# Patient Record
(demographics unavailable — no encounter records)

---

## 2024-10-11 NOTE — ASSESSMENT
[FreeTextEntry1] : ATTENDING ATTESTATIONending  64 year old female here for NPA. Pmhx of GERD, Insomnia, MV/TV replacement/repeair?, tachycardia, lower back pain, HLD, anxiety. Here today with daughter Shanel.  ID [id number]  Here today for persistent SOB/cough - Cont fluticasone nasal spray  - START Symbicort rinse mouth out after - Cont albuterol hfa/nebs Q4-6H PRN  - Avoid PO steroids possible ULCER - CPFT was wnl 9/30/24 Normal spirometry.  No significant bronchodilator response.  Normal TLC, elevated ERV, reduced RV.  Normal DLCO. - Reinforced importance of f/u with GI for GERD likely contributing to symptoms - Chest CT 8/18/24 - lungs clear (CXR and CTPA 8/22 were wnl) CXR 10/4/24 reportedly normal - Walked in office mirela 91% quickly went up to 96% on room air with HR 85 - Recommended pt f/u with cardiology for ECHO  - unclear if needs Cardiac clearance for EGD sent message to GI  - Discussed importance of cancer screenings and to f/u with PCP provided list   Preprocedure Respiratory Clearance - patient is at Mild risk for pulmonary complications from ENDOSCOPY PROCEDURE with conscious sedation. Pt is optimized from a respiratory standpoint. I recommended patient to continue ACT and maintenance medications after procedure.  f/u [facility]

## 2024-10-11 NOTE — END OF VISIT
[FreeTextEntry3] : I, Dr. Winter Cesar personally performed the evaluation and management (E/M) services for this new patient.  That E/M includes conducting the initial examination, assessing all conditions, and establishing the plan of care.  Today, Ana Luisa Yanez ACP was here to observe my evaluation and management services for this patient to be followed going forward.  Patient here for follow up of shortness of breath and suspect it is mainly attributed to symptoms of severe GERD. She had another recent ER visit at outside hospital for dyspnea and was managed as severe GERD and discharged from ED. She has unintentional weight loss and is on protonix, famotidine, sucralfate.  She has clear lungs on exam, on CT chest, O2 sat is fine 99%, normal spirometry. In supine position, she is not short of breath and only endorses epigastric discomfort.   Re-educated her on Symbicort inhaler use (she did not fill it from last OV) but EGD appears to be priority. She does not have any pulmonary contraindications to EGD currently and is optimized from a respiratory standpoint. I reached out to GI to contact pt to clarify that she fulfilled her cardiac clearance and procedure remains scheduled for next week.  Follow-up in office in 3 months or sooner if needed.  45 minutes time spent for patient education related to comorbidities and medications, medical records/labs/radiology reviews, preventative care, documentation, coordination of care.

## 2024-10-11 NOTE — PHYSICAL EXAM
[No Acute Distress] : no acute distress [Normal Oropharynx] : normal oropharynx [Normal Appearance] : normal appearance [Supple] : supple [Normal Rate/Rhythm] : normal rate/rhythm [Normal S1, S2] : normal s1, s2 [No Resp Distress] : no resp distress [No Acc Muscle Use] : no acc muscle use [Clear to Auscultation Bilaterally] : clear to auscultation bilaterally [Benign] : benign [Normal Bowel Sounds] : normal bowel sounds [No Clubbing] : no clubbing [No Cyanosis] : no cyanosis [No Edema] : no edema [Normal Color/ Pigmentation] : normal color/ pigmentation [No Focal Deficits] : no focal deficits [Oriented x3] : oriented x3 [Normal Affect] : normal affect [Soft] : soft [TextBox_2] : thin female [TextBox_11] : mild PND [TextBox_54] : murmur appreciated [TextBox_68] : no wheezes or crackles. [TextBox_89] : hypogastric tenderness to palpation [TextBox_99] : Nontender Back palpated

## 2024-10-11 NOTE — HISTORY OF PRESENT ILLNESS
[Never] : never [TextBox_4] : 64 year old female here for NPA. Pmhx of GERD, Insomnia, MV/TV replacement/repair?, tachycardia, lower back pain, HLD, anxiety. Here today with spouse.  ID [id number]  Patient is here today reporting persistent SOB and intermittent cough with some clear sputum since 8/12 when URI symptoms began. Pt has been seen by several ERs and UCs in the last 2 months. At her last visit she was prescribed Prednisone, Zpack, Symbciort and Albuterol. She did not  the Symbicort. She felt no improvement on any of these medications. She is still taking albuterol nebs 4 times a day with no reported benefit. Was also previously prescribed azithromycin, tessalon, prednisone, keflex, fluticasone inhaler, albuterol hfa, IM dose of solumedrol at a . She went to [facility] on 10/4 again for SOB and was prescribed Famotidine and Tylenol, CXR and Back Xray per patient were normal. She has had CXRs, Chest CT and a CTPA all wnl since August. Weight loss 10 lbs in the 2 months (also has severe GERD pending EGD on PPI, Sucralfate, H2), chills, fatigue. Reports normal Bms. No fevers, bodyaches, vomiting, swelling, cp, pnd, hx of DM/HF/Glaucoma, night sweats, wheeze. Using Ensure and recently prescribed appetite stimulant. Over due for Mammo, PAP and COLO. Never smoker, no drugs/etoh/vaping. Doesn't work. Born in [place]. Unclear if she still needs cardiac clearance and has not had ECHO recently.

## 2024-10-17 NOTE — PLAN
Received request via: Pharmacy    Was the patient seen in the last year in this department? Yes    Does the patient have an active prescription (recently filled or refills available) for medication(s) requested? No    Does the patient have [facility] Plus and need 100 day supply (blood pressure, diabetes and cholesterol meds only)? Patient does not have SCP   [TextEntry] : Prior to procedure patient reporting worsening shortness of breath.  Procedure cancelled. Patient to follow up in office for further evaluation / management.

## 2024-10-17 NOTE — HISTORY OF PRESENT ILLNESS
[FreeTextEntry1] : Ms. Gandhi is a 64 year old woman with chronic upper abdominal pain presenting for upper endoscopy evaluation.

## 2024-10-17 NOTE — PHYSICAL EXAM

## 2024-10-17 NOTE — ASSESSMENT
[FreeTextEntry1] : Ms. Gandhi is a 64 year old woman with chronic upper abdominal pain presenting for upper endoscopy evaluation. Indication, risks and benefit of EGD discussed with patient in detail. All questions answered. Patient is medically optimized (Pulmonary and Cardiology risk stratification obtained pre-op) and agreeable to proceed with planned procedure.

## 2024-10-22 NOTE — DISCUSSION/SUMMARY
[FreeTextEntry1] : Mrs. Gandhi is complaining of shortness of breath with greatly limits are associated with stress and rapid heart beating.  The cause of her symptoms is unclear.  She has had heart surgery in reportedly a mitral and tricuspid prosthesis placed.  Her exam shows a normal female in no obvious distress.  Pulse is 80 and regular, blood pressure 110/70, chest clear, and her cardiac exam within normal limits.  Her EKG done recently in the ER is abnormal as noted above.  Troponin and BNP were unremarkable.  At this point it is unclear what sort of heart disease she has and whether it has anything to do with her symptoms.  A records release form was sent to [facility] to obtain her old records.  She will be scheduled for an echo and stress echo.

## 2024-10-22 NOTE — HISTORY OF PRESENT ILLNESS
[FreeTextEntry1] : 64-year-old female with a history of prior cardiac surgery, palpitations, and shortness of breath returns for follow-up.  She was originally seen in August and preop evaluation prior to endoscopy.  She was unaware of her cardiac diagnosis and was followed at [facility] so she was supposed to follow-up with them to get cleared.  She subsequently has developed a respiratory infection and has been persistently short of breath.  It is unclear if she did in fact follow-up, but apparently they felt there was no cardiac problem.  She reports that she is very short of breath and very anxious despite treatment with antibiotics and bronchodilators.  She has been seen in the emergency room at [facility].  Chest x-ray and CT do not show any cause for the shortness of breath.  An EKG shows left axis deviation, poor R wave progression and some ST-T wave changes.  She has not had formal cardiac evaluation.  She returns because she is still symptomatic and feels she can no longer travel to the city.  She wants to be worked up here.  She states she had heart surgery in 1987.  A review of the emergency room records indicate that she has had both mitral and tricuspid valve replacement.  The source of that information is unclear.  She has a sensation of rapid heart beating and has been on Cardizem for some time.